# Patient Record
Sex: FEMALE | Race: OTHER | Employment: FULL TIME | URBAN - METROPOLITAN AREA
[De-identification: names, ages, dates, MRNs, and addresses within clinical notes are randomized per-mention and may not be internally consistent; named-entity substitution may affect disease eponyms.]

---

## 2020-11-23 ENCOUNTER — TELEPHONE (OUTPATIENT)
Dept: OTHER | Age: 20
End: 2020-11-23

## 2020-11-23 ENCOUNTER — TELEMEDICINE (OUTPATIENT)
Dept: FAMILY MEDICINE CLINIC | Facility: CLINIC | Age: 20
End: 2020-11-23
Payer: COMMERCIAL

## 2020-11-23 DIAGNOSIS — Z20.822 SUSPECTED COVID-19 VIRUS INFECTION: Primary | ICD-10-CM

## 2020-11-23 PROCEDURE — 99203 OFFICE O/P NEW LOW 30 MIN: CPT | Performed by: PHYSICIAN ASSISTANT

## 2020-11-23 NOTE — TELEPHONE ENCOUNTER
Is a new patient.   Reason for Call/Symptoms:   Needs covid test for work    Onset: started on Sunday 11/22/20    Courtesy Assessment:     The patient stated she has  Fever unknown,  chills, sweats, dry cough, weakness, fatigue, 2-3/10 pain, soreness, lit

## 2020-11-23 NOTE — PROGRESS NOTES
Please note that the following visit was completed using two-way, real-time interactive audio and/or video communication.   This has been done in good ewa to provide continuity of care in the best interest of the provider-patient relationship, due to the The patient was made aware of where to find [facility] notice of privacy practices, telehealth consent form and other related consent forms and documents. which are located on the [facility] website.  The patient verbally agreed to telehealth consent form, related c covid-19 virus infection  (primary encounter diagnosis)    1.  Suspected COVID-19 virus infection  -After discussion with patient, advised the following:  -Told to get tested and went over procedure for testing  -Will call with results  -Went over procedure

## 2020-11-24 ENCOUNTER — APPOINTMENT (OUTPATIENT)
Dept: LAB | Age: 20
End: 2020-11-24
Attending: PHYSICIAN ASSISTANT
Payer: COMMERCIAL

## 2020-11-24 DIAGNOSIS — Z20.822 SUSPECTED COVID-19 VIRUS INFECTION: ICD-10-CM
